# Patient Record
Sex: MALE | URBAN - METROPOLITAN AREA
[De-identification: names, ages, dates, MRNs, and addresses within clinical notes are randomized per-mention and may not be internally consistent; named-entity substitution may affect disease eponyms.]

---

## 2017-07-27 ENCOUNTER — OFFICE VISIT (OUTPATIENT)
Dept: OTHER | Facility: HOSPITAL | Age: 47
End: 2017-07-27
Attending: EMERGENCY MEDICINE

## 2017-07-27 DIAGNOSIS — Z00.00 ROUTINE GENERAL MEDICAL EXAMINATION AT A HEALTH CARE FACILITY: Primary | ICD-10-CM

## 2017-07-27 PROCEDURE — 86480 TB TEST CELL IMMUN MEASURE: CPT

## 2017-07-31 LAB
M TB IFN-G CD4+ BCKGRND COR BLD-ACNC: 0.07 IU/ML
M TB IFN-G CD4+ T-CELLS BLD-ACNC: 0.06 IU/ML
M TB TUBERC IFN-G BLD QL: NEGATIVE
M TB TUBERC IGNF/MITOGEN IGNF CONTROL: 7.62 IU/ML

## 2018-02-22 ENCOUNTER — OFFICE VISIT (OUTPATIENT)
Dept: FAMILY MEDICINE CLINIC | Facility: CLINIC | Age: 48
End: 2018-02-22

## 2018-02-22 VITALS
DIASTOLIC BLOOD PRESSURE: 90 MMHG | BODY MASS INDEX: 31.53 KG/M2 | TEMPERATURE: 98 F | RESPIRATION RATE: 16 BRPM | OXYGEN SATURATION: 98 % | HEART RATE: 84 BPM | SYSTOLIC BLOOD PRESSURE: 142 MMHG | WEIGHT: 253.63 LBS | HEIGHT: 75 IN

## 2018-02-22 DIAGNOSIS — J06.9 VIRAL UPPER RESPIRATORY TRACT INFECTION: Primary | ICD-10-CM

## 2018-02-22 DIAGNOSIS — R03.0 ELEVATED BLOOD PRESSURE READING: ICD-10-CM

## 2018-02-22 PROCEDURE — 99202 OFFICE O/P NEW SF 15 MIN: CPT | Performed by: NURSE PRACTITIONER

## 2018-02-22 NOTE — PROGRESS NOTES
CHIEF COMPLAINT:   Patient presents with:  URI: Needs work note      HPI:   Dinh Mendes is a 57year old male who presents for upper respiratory symptoms for  4 days.  Patient reports he traveled to [place] over the weekend and developed a cough, sore Jae Desai is a 57year old male who presents with upper respiratory symptoms that are consistent with    ASSESSMENT:   Viral upper respiratory tract infection  (primary encounter diagnosis)  Elevated blood pressure reading    PLAN:     Viral URI: Comf · You may use acetaminophen or ibuprofen to control pain and fever, unless another medicine was prescribed. If you have chronic liver or kidney disease, have ever had a stomach ulcer or gastrointestinal bleeding, or are taking blood-thinning medicines, chin

## 2018-07-10 ENCOUNTER — OCC HEALTH (OUTPATIENT)
Dept: OTHER | Facility: HOSPITAL | Age: 48
End: 2018-07-10
Attending: PREVENTIVE MEDICINE

## 2018-07-10 DIAGNOSIS — Z01.84 IMMUNITY STATUS TESTING: Primary | ICD-10-CM

## 2018-07-10 PROCEDURE — 86480 TB TEST CELL IMMUN MEASURE: CPT

## 2018-07-13 LAB
M TB TUBERC IFN-G BLD QL: NEGATIVE
M TB TUBERC IFN-G/MITOGEN IGNF BLD: 0.2 IU/ML
M TB TUBERC IGNF/MITOGEN IGNF CONTROL: >10 IU/ML
MITOGEN IGNF BCKGRD COR BLD-ACNC: 0.12 IU/ML

## 2018-11-06 ENCOUNTER — OFFICE VISIT (OUTPATIENT)
Dept: OTHER | Facility: HOSPITAL | Age: 48
End: 2018-11-06
Attending: PREVENTIVE MEDICINE
Payer: OTHER MISCELLANEOUS

## 2018-11-06 DIAGNOSIS — Z77.21 EXPOSURE TO BLOOD OR BODY FLUID: Primary | ICD-10-CM

## 2018-11-06 PROCEDURE — 86706 HEP B SURFACE ANTIBODY: CPT

## 2018-11-06 PROCEDURE — 87389 HIV-1 AG W/HIV-1&-2 AB AG IA: CPT

## 2018-11-06 PROCEDURE — 86803 HEPATITIS C AB TEST: CPT

## 2019-06-26 ENCOUNTER — OCC HEALTH (OUTPATIENT)
Dept: OTHER | Facility: HOSPITAL | Age: 49
End: 2019-06-26
Attending: PREVENTIVE MEDICINE

## 2019-06-26 DIAGNOSIS — Z01.84 IMMUNITY STATUS TESTING: Primary | ICD-10-CM

## 2019-06-26 PROCEDURE — 86480 TB TEST CELL IMMUN MEASURE: CPT

## 2019-07-01 LAB
M TB TUBERC IFN-G BLD QL: NEGATIVE
M TB TUBERC IFN-G/MITOGEN IGNF BLD: 0.1 IU/ML
M TB TUBERC IFN-G/MITOGEN IGNF BLD: 0.12 IU/ML
M TB TUBERC IGNF/MITOGEN IGNF CONTROL: 9.76 IU/ML
MITOGEN IGNF BCKGRD COR BLD-ACNC: 0.04 IU/ML

## (undated) NOTE — LETTER
Date: 2/22/2018    Patient Name: Liliana Corona          To Whom it may concern: This letter has been written at the patient's request. The above patient was seen at the [facility] for treatment of a medical condition.     This patient arlethu